# Patient Record
Sex: FEMALE | Race: WHITE | Employment: OTHER | URBAN - NONMETROPOLITAN AREA
[De-identification: names, ages, dates, MRNs, and addresses within clinical notes are randomized per-mention and may not be internally consistent; named-entity substitution may affect disease eponyms.]

---

## 2017-10-11 ENCOUNTER — OFFICE VISIT (OUTPATIENT)
Dept: PULMONOLOGY | Age: 72
End: 2017-10-11
Payer: MEDICARE

## 2017-10-11 VITALS
RESPIRATION RATE: 18 BRPM | WEIGHT: 118 LBS | BODY MASS INDEX: 23.79 KG/M2 | HEART RATE: 64 BPM | HEIGHT: 59 IN | OXYGEN SATURATION: 98 % | SYSTOLIC BLOOD PRESSURE: 96 MMHG | DIASTOLIC BLOOD PRESSURE: 60 MMHG

## 2017-10-11 DIAGNOSIS — Z99.89 OSA ON CPAP: Primary | ICD-10-CM

## 2017-10-11 DIAGNOSIS — G47.33 OSA ON CPAP: Primary | ICD-10-CM

## 2017-10-11 PROCEDURE — 3017F COLORECTAL CA SCREEN DOC REV: CPT | Performed by: INTERNAL MEDICINE

## 2017-10-11 PROCEDURE — G8420 CALC BMI NORM PARAMETERS: HCPCS | Performed by: INTERNAL MEDICINE

## 2017-10-11 PROCEDURE — 1090F PRES/ABSN URINE INCON ASSESS: CPT | Performed by: INTERNAL MEDICINE

## 2017-10-11 PROCEDURE — 1036F TOBACCO NON-USER: CPT | Performed by: INTERNAL MEDICINE

## 2017-10-11 PROCEDURE — 4040F PNEUMOC VAC/ADMIN/RCVD: CPT | Performed by: INTERNAL MEDICINE

## 2017-10-11 PROCEDURE — G8427 DOCREV CUR MEDS BY ELIG CLIN: HCPCS | Performed by: INTERNAL MEDICINE

## 2017-10-11 PROCEDURE — G8400 PT W/DXA NO RESULTS DOC: HCPCS | Performed by: INTERNAL MEDICINE

## 2017-10-11 PROCEDURE — 99213 OFFICE O/P EST LOW 20 MIN: CPT | Performed by: INTERNAL MEDICINE

## 2017-10-11 PROCEDURE — 1123F ACP DISCUSS/DSCN MKR DOCD: CPT | Performed by: INTERNAL MEDICINE

## 2017-10-11 PROCEDURE — G8484 FLU IMMUNIZE NO ADMIN: HCPCS | Performed by: INTERNAL MEDICINE

## 2017-10-11 PROCEDURE — 3014F SCREEN MAMMO DOC REV: CPT | Performed by: INTERNAL MEDICINE

## 2018-01-03 ENCOUNTER — OFFICE VISIT (OUTPATIENT)
Dept: PULMONOLOGY | Age: 73
End: 2018-01-03
Payer: MEDICARE

## 2018-01-03 VITALS
WEIGHT: 120 LBS | RESPIRATION RATE: 16 BRPM | DIASTOLIC BLOOD PRESSURE: 60 MMHG | HEART RATE: 74 BPM | SYSTOLIC BLOOD PRESSURE: 100 MMHG | BODY MASS INDEX: 24.19 KG/M2 | OXYGEN SATURATION: 98 % | HEIGHT: 59 IN

## 2018-01-03 DIAGNOSIS — Z99.89 OSA ON CPAP: Primary | ICD-10-CM

## 2018-01-03 DIAGNOSIS — G47.33 OSA ON CPAP: Primary | ICD-10-CM

## 2018-01-03 PROCEDURE — 3017F COLORECTAL CA SCREEN DOC REV: CPT | Performed by: INTERNAL MEDICINE

## 2018-01-03 PROCEDURE — G8427 DOCREV CUR MEDS BY ELIG CLIN: HCPCS | Performed by: INTERNAL MEDICINE

## 2018-01-03 PROCEDURE — 1090F PRES/ABSN URINE INCON ASSESS: CPT | Performed by: INTERNAL MEDICINE

## 2018-01-03 PROCEDURE — 1036F TOBACCO NON-USER: CPT | Performed by: INTERNAL MEDICINE

## 2018-01-03 PROCEDURE — G8420 CALC BMI NORM PARAMETERS: HCPCS | Performed by: INTERNAL MEDICINE

## 2018-01-03 PROCEDURE — 1123F ACP DISCUSS/DSCN MKR DOCD: CPT | Performed by: INTERNAL MEDICINE

## 2018-01-03 PROCEDURE — 4040F PNEUMOC VAC/ADMIN/RCVD: CPT | Performed by: INTERNAL MEDICINE

## 2018-01-03 PROCEDURE — 99213 OFFICE O/P EST LOW 20 MIN: CPT | Performed by: INTERNAL MEDICINE

## 2018-01-03 PROCEDURE — G8484 FLU IMMUNIZE NO ADMIN: HCPCS | Performed by: INTERNAL MEDICINE

## 2018-01-03 PROCEDURE — 3014F SCREEN MAMMO DOC REV: CPT | Performed by: INTERNAL MEDICINE

## 2018-01-03 PROCEDURE — G8400 PT W/DXA NO RESULTS DOC: HCPCS | Performed by: INTERNAL MEDICINE

## 2018-01-03 RX ORDER — FAMOTIDINE 40 MG/1
40 TABLET, FILM COATED ORAL DAILY
COMMUNITY

## 2018-01-03 NOTE — PROGRESS NOTES
Sleep Medicine Follow Up  Niru Larsen MD    Joaquim Giles, 71 y.o.  [id number]     Nurses Notes   Radhika Paci is here for a 3 month f/u with a download    Date of Last Visit  10/11/17      Scan of Download      Summary of PAP Download     Dates  12/4/17  -   1/2/18  Four Hour Compliance - 97 %. Total Hours -  179:46  Average Hours/Day -  6:00       AHI -  0.9    Original AHI -  13.7     Initial Study Date -  2/2/15  PAP Type -  cpap      Machine Type -Resmed      Pressure Settings -  9 cwp  Interface -  full    Provider  [][facility]  [][employer]  [][facility]  [][facility]         [x][facility] []Other:     ESS: 9   Neck Circumference:    12    INTERVAL HISTORY         Joaquim Giles is a 71 y.o. old female who comes in for follow up regarding her obstructive sleep apnea. She is currently doing well using her positive airway pressure device. She is sleeping better at night with her machine and says she has less daytime sleepiness.      Really good numbers with the new mask  Very pleased with device, sleeps a lot better, denies somnolence    Past Medical History:   Diagnosis Date    Arthritis     Atrial flutter ([facility]     Cancer ([facility]     Hypertension     Thyroid disease      Past Surgical History:   Procedure Laterality Date    BLADDER SUSPENSION      BREAST SURGERY      2 breast biopsy, right breast    CHOLECYSTECTOMY      HYSTERECTOMY         Social History   Substance Use Topics    Smoking status: Former Smoker     Packs/day: 0.50     Years: 22.00     Types: Cigarettes     Start date: 5/5/1964     Quit date: 5/5/1986    Smokeless tobacco: Never Used    Alcohol use No       ALLERGIES  Allergies   Allergen Reactions    Prednisone Palpitations    Ceftin [Cefuroxime] Other (See Comments)     Stomach upset    Seasonal     Verapamil      Current Outpatient Prescriptions   Medication Sig Dispense Refill    famotidine (PEPCID) 40 MG tablet Take 40 mg by mouth daily      digoxin (LANOXIN) 125 MCG tablet Take 125 mcg by mouth mass index is 24.24 kg/m². Oxygen level - RA        Constitutional - No distress. Patient is oriented to person, place, and time. Head  - Normocephalic and atraumatic. Mouth/Throat - Oropharynx is clear and moist.   Dentition - good  Mallampati Score - II (soft palate, uvula, fauces visible)  Cardiovascular - Normal rate, regular rhythm, normal heart sounds. No murmur heard. Pulmonary/Chest -  Effort normal and breath sounds normal.   Neurological - Patient is alert and oriented to person, place, and time. Psychiatric - Patient  has a normal mood and affect. ASSESSMENT  Obstructive Sleep Apnea (MOHSEN)  Good control with current therapy with significant improvement in clinical symptoms. Interface leak--not affecting control    RECOMMENDATIONS    Will talk to DME regarding mask fitting next visit    Dustin Jj is doing well. We will continue Dustin Jj on the same PAP settings as she is currently on and arrange for follow up with her DME provider as needed between now and the next time we see her. She can call the sleep clinic for an earlier appointment if needed for worsening of sleep symptoms.      FOLLOW UP   9 months with a download